# Patient Record
Sex: FEMALE
[De-identification: names, ages, dates, MRNs, and addresses within clinical notes are randomized per-mention and may not be internally consistent; named-entity substitution may affect disease eponyms.]

---

## 2023-06-08 PROBLEM — Z00.00 ENCOUNTER FOR PREVENTIVE HEALTH EXAMINATION: Status: ACTIVE | Noted: 2023-06-08

## 2023-06-21 ENCOUNTER — APPOINTMENT (OUTPATIENT)
Dept: NEUROSURGERY | Facility: CLINIC | Age: 53
End: 2023-06-21
Payer: MEDICARE

## 2023-06-21 ENCOUNTER — NON-APPOINTMENT (OUTPATIENT)
Age: 53
End: 2023-06-21

## 2023-06-21 VITALS
OXYGEN SATURATION: 98 % | HEART RATE: 74 BPM | WEIGHT: 186 LBS | BODY MASS INDEX: 31.76 KG/M2 | HEIGHT: 64 IN | SYSTOLIC BLOOD PRESSURE: 125 MMHG | RESPIRATION RATE: 18 BRPM | DIASTOLIC BLOOD PRESSURE: 83 MMHG

## 2023-06-21 DIAGNOSIS — R56.9 UNSPECIFIED CONVULSIONS: ICD-10-CM

## 2023-06-21 PROCEDURE — 99205 OFFICE O/P NEW HI 60 MIN: CPT

## 2023-06-21 NOTE — PHYSICAL EXAM
[General Appearance - Alert] : alert [General Appearance - In No Acute Distress] : in no acute distress [General Appearance - Well Nourished] : well nourished [General Appearance - Well-Appearing] : healthy appearing [Oriented To Time, Place, And Person] : oriented to person, place, and time [Impaired Insight] : insight and judgment were intact [Affect] : the affect was normal [Memory Recent] : recent memory was not impaired [Person] : oriented to person [Place] : oriented to place [Time] : oriented to time [Short Term Intact] : short term memory intact [Remote Intact] : remote memory intact [Registration Intact] : recent registration memory intact [Span Intact] : the attention span was normal [Concentration Intact] : normal concentrating ability [Fluency] : fluency intact [Comprehension] : comprehension intact [Current Events] : adequate knowledge of current events [Past History] : adequate knowledge of personal past history [Vocabulary] : adequate range of vocabulary [PERRL With Normal Accommodation] : pupils were equal in size, round, reactive to light, with normal accommodation [Extraocular Movements] : extraocular movements were intact

## 2023-06-24 NOTE — DATA REVIEWED
[de-identified] : brain IAC w/wo @ [facility] on 5/1/2023\par IMPRESSION: \par  \par  \par Stable examination. No acute abnormality.\par  \par  \par CLINICAL INDICATION: Left trigeminal neuralgia gamma knife\par  \par TECHNIQUE: Multi-planar multi-sequential MR imaging of the brain with special attention to the internal auditory canals was performed before and after the intravenous administration of contrast. 8.2 ml of GADOBUTROL 10 MMOL/10 ML (1 MMOL/ML) INTRAVENOUS SOLUTION was administered (the balance of single use vial(s) has/have been discarded).\par  \par COMPARISON: July 5, 2020\par  \par FINDINGS:\par  \par Evaluation of the internal auditory canals and cerebellopontine angle cisterns reveals no mass and no abnormal enhancement. The cisternal and canalicular portions of the seventh and eighth cranial nerves are normal. The bilateral vestibulocochlear complexes appear intact.\par There is a mildly, asymmetrically diminutive volume of the cisternal left 5th nerve\par No acute infarction, intracranial hemorrhage or intraparenchymal mass lesion. No abnormal intraparenchymal enhancement is identified.\par  \par No hydrocephalus. No extra-axial fluid collections. The skull base flow voids are present.\par  \par The visualized intraorbital contents are normal. The imaged portions of the paranasal sinuses are clear. The mastoid air cells are clear. The visualized osseous structures, soft tissues and partially visualized parotid glands appear normal.\par  \par Electronic Signature: I personally reviewed the images and agree with this report. Final Report: Dictated by  and Signed by Attending Sadny Rodriguez MD 5/1/2023 6:14 PM

## 2023-06-24 NOTE — ASSESSMENT
[FreeTextEntry1] : Discussed with the patient for gamma knife SRS for L trigeminal neuralgia. Risks, benefit and alternative to the surgery was discussed with the patient. She verbalizes understanding and would like to proceed.\par \par - tentative GK SRS on 7/18/2023

## 2023-06-24 NOTE — HISTORY OF PRESENT ILLNESS
[FreeTextEntry1] : L side facial pain [de-identified] : 54 yo F with PMH of epilepsy presents for trigeminal neuralgia. \par She reports chronic L side facial pain for over 12 years which has been progressively worsening. Pain is described as intermittent sharp pain on her L sided frontotemporal area head and upper portion of L side face worsening by touch/cold water/wind/speech.\par She tried medications( tramadol, gabapentin, naproxen) with minimal relief. She tried Botox injection with some relief. \par She reports intermittent b/l blurry vision (states has been evaluated by opthalmology) but denies hearing problem, facial weakness, seizure activity or any other focal neuro deficits. \par \par MRI brain IAC w/wo was completed by neurology which showed and referred neuro sx for possible radiation treatment.  \par seizure activity for 6 years: feeling dizzy then passed out.

## 2023-06-24 NOTE — REASON FOR VISIT
oxi   [New Patient Visit] : a new patient visit [Referred By: _________] : Patient was referred by [facility] [[employer] ] : provided by [employer]   [Interpreters_IDNumber] : [id number] [TWNoteComboBox1] : Portuguese

## 2023-06-24 NOTE — ADDENDUM
[FreeTextEntry1] : Patient with intractable left trigeminal neuralgia. Referred by neurology for intervention due to poor pain relief with multiple medication trials over many years. Symptoms are of classic left trigeminal neuralgia in V1 and V2 distribution. We discussed interventions including microvascular decompression, percutaneous trigeminal rhizotomy, neuromodulation, and gamma knife radiosurgery. Due to the minimal invasiveness of gamma knife, this modality is favored for initial treatment. Risks including but not limited to facial numbness, treatment failure, radiation necrosis discussed. Patient would like to move forward with radiosurgery.

## 2023-06-29 ENCOUNTER — APPOINTMENT (OUTPATIENT)
Dept: RADIATION ONCOLOGY | Facility: CLINIC | Age: 53
End: 2023-06-29
Payer: MEDICAID

## 2023-06-29 DIAGNOSIS — Z83.3 FAMILY HISTORY OF DIABETES MELLITUS: ICD-10-CM

## 2023-06-29 PROCEDURE — 77263 THER RADIOLOGY TX PLNG CPLX: CPT

## 2023-06-29 PROCEDURE — 99443: CPT | Mod: 25

## 2023-06-29 NOTE — VITALS
[Maximal Pain Intensity: 6/10] : 6/10 [Least Pain Intensity: 0/10] : 0/10 [Pain Description/Quality: ___] : Pain description/quality: [unfilled] [Date: ____________] : Patient's last distress assessment performed on [unfilled]. [5 - Distress Level] : Distress Level: 5 [90: Able to carry normal activity; minor signs or symptoms of disease.] : 90: Able to carry normal activity; minor signs or symptoms of disease.  [ECOG Performance Status: 1 - Restricted in physically strenuous activity but ambulatory and able to carry out work of a light or sedentary nature] : Performance Status: 1 - Restricted in physically strenuous activity but ambulatory and able to carry out work of a light or sedentary nature, e.g., light house work, office work

## 2023-06-30 ENCOUNTER — OUTPATIENT (OUTPATIENT)
Dept: OUTPATIENT SERVICES | Facility: HOSPITAL | Age: 53
LOS: 1 days | Discharge: HOME | End: 2023-06-30
Payer: MEDICAID

## 2023-07-18 ENCOUNTER — APPOINTMENT (OUTPATIENT)
Dept: NEUROSURGERY | Facility: AMBULATORY SURGERY CENTER | Age: 53
End: 2023-07-18
Payer: MEDICARE

## 2023-07-18 ENCOUNTER — APPOINTMENT (OUTPATIENT)
Dept: MRI IMAGING | Facility: IMAGING CENTER | Age: 53
End: 2023-07-18

## 2023-07-18 ENCOUNTER — OUTPATIENT (OUTPATIENT)
Dept: OUTPATIENT SERVICES | Facility: HOSPITAL | Age: 53
LOS: 1 days | End: 2023-07-18
Payer: MEDICAID

## 2023-07-18 DIAGNOSIS — G50.0 TRIGEMINAL NEURALGIA: ICD-10-CM

## 2023-07-18 PROCEDURE — 77295 3-D RADIOTHERAPY PLAN: CPT | Mod: 26

## 2023-07-18 PROCEDURE — 61800 APPLY SRS HEADFRAME ADD-ON: CPT

## 2023-07-18 PROCEDURE — 76498 UNLISTED MR PROCEDURE: CPT

## 2023-07-18 PROCEDURE — 61798 SRS CRANIAL LESION COMPLEX: CPT

## 2023-07-18 PROCEDURE — 77432 STEREOTACTIC RADIATION TRMT: CPT

## 2023-07-18 PROCEDURE — 77300 RADIATION THERAPY DOSE PLAN: CPT | Mod: 26

## 2023-07-18 PROCEDURE — A9585: CPT

## 2023-07-18 RX ORDER — ACETAMINOPHEN 10 MG/ML
1000 INJECTION INTRAVENOUS
Qty: 0 | Refills: 0 | Status: COMPLETED | OUTPATIENT
Start: 2023-07-17

## 2023-07-18 RX ORDER — IBUPROFEN 400 MG/1
400 TABLET, FILM COATED ORAL
Qty: 0 | Refills: 0 | Status: COMPLETED | OUTPATIENT
Start: 2023-07-17

## 2023-07-18 RX ORDER — LIDOCAINE HYDROCHLORIDE 20 MG/ML
2 INJECTION, SOLUTION INFILTRATION; PERINEURAL
Qty: 0 | Refills: 0 | Status: COMPLETED | OUTPATIENT
Start: 2023-07-17

## 2023-07-18 NOTE — HISTORY OF PRESENT ILLNESS
[Home] : at home, [unfilled] , at the time of the visit. [Medical Office: ([facility])___] : at the medical office located in  [Verbal consent obtained from patient] : the patient, [unfilled] [FreeTextEntry1] : Ms. Miller presents today for consideration for radiation therapy for trigeminal neuralgia. \par \par She reports chronic left sided facial pain, progressively worsening over the past 12 years, intermittent and sharp. \par Reports pain comes and goes and affects the cheek V2. Episodes 2-3 x/week lasting for < 60 seconds\par Symptoms worse with temperature extremes\par \par Treatments\par injection, radiofrequency ablation\par Tramadol, Naproxen,  Neurontin, Carbamazepine, Topamax (all gave short lived relief)\par \par MRI brain done 5/1/2023 showed a mildly asymmetrically diminutive volume of the cisternal left 5th nerve.

## 2023-07-18 NOTE — REASON FOR VISIT
[Consideration of Curative Therapy] : consideration of curative therapy for [Other: ___] : [unfilled] [[employer] ] : provided by [employer]   [Interpreters_IDNumber] : [id number] [Interpreters_FullName] : Derrell [TWNoteComboBox1] : [place]

## 2023-07-19 ENCOUNTER — NON-APPOINTMENT (OUTPATIENT)
Age: 53
End: 2023-07-19

## 2023-08-18 ENCOUNTER — APPOINTMENT (OUTPATIENT)
Dept: NEUROSURGERY | Facility: CLINIC | Age: 53
End: 2023-08-18
Payer: MEDICAID

## 2023-08-18 DIAGNOSIS — Z92.3 PERSONAL HISTORY OF IRRADIATION: ICD-10-CM

## 2023-08-18 PROCEDURE — 99024 POSTOP FOLLOW-UP VISIT: CPT

## 2023-08-18 NOTE — REASON FOR VISIT
[Follow-Up: _____] : a [unfilled] follow-up visit [Home] : at home, [unfilled] , at the time of the visit. [Medical Office: ([facility])___] : at the medical office located in  [Spouse] : spouse [FreeTextEntry1] : s/p GKRS to left trigeminal nerve on 7/18/23

## 2023-08-18 NOTE — HISTORY OF PRESENT ILLNESS
[FreeTextEntry1] : L side facial pain [de-identified] : 52 yo F with PMH of epilepsy presents for trigeminal neuralgia.  She reports chronic L side facial pain for over 12 years which has been progressively worsening. Pain is described as intermittent sharp pain on her L sided frontotemporal area head and upper portion of L side face worsening by touch/cold water/wind/speech. She tried medications( tramadol, gabapentin, naproxen) with minimal relief. She tried Botox injection with some relief.  She reports intermittent b/l blurry vision (states has been evaluated by opthalmology) but denies hearing problem, facial weakness, seizure activity or any other focal neuro deficits.   MRI brain IAC w/wo was completed by neurology which showed and referred neuro sx for possible radiation treatment.   seizure activity for 6 years: feeling dizzy then passed out.   She is primarily Slovak speaking. Interpretation is provided via [facility] [employer]. Returns for follow up s/p GKRS to left trigeminal nerve on 7/18/23. She feels that her symptoms have slightly improved since procedure. Remains on medications including tramadol, gabapentin and meloxicam.

## 2023-08-18 NOTE — ASSESSMENT
[FreeTextEntry1] : Doing well s/p GKRS  Recommend follow up in 3 months to evaluate progress  Patient verbalizes agreement and understanding with plan of care. I, Dr. Recinos, personally performed the evaluation and management (E/M) services for this established patient who presents today with (a) new problem(s)/exacerbation of (an) existing condition(s).  That E/M includes conducting the examination, assessing all new/exacerbated conditions, and establishing a new plan of care.  Today, my ACP, Macy Ramey, was here to observe my evaluation and management services for this new problem/exacerbated condition to be followed going forward.

## 2023-09-27 ENCOUNTER — APPOINTMENT (OUTPATIENT)
Dept: RADIATION ONCOLOGY | Facility: CLINIC | Age: 53
End: 2023-09-27
Payer: MEDICAID

## 2023-09-27 DIAGNOSIS — G50.0 TRIGEMINAL NEURALGIA: ICD-10-CM

## 2023-09-27 PROCEDURE — 99024 POSTOP FOLLOW-UP VISIT: CPT

## 2023-09-27 RX ORDER — FUROSEMIDE 20 MG/1
20 TABLET ORAL
Refills: 0 | Status: ACTIVE | COMMUNITY
Start: 2023-09-27

## 2023-09-27 RX ORDER — MEMANTINE HYDROCHLORIDE 10 MG/1
10 TABLET, FILM COATED ORAL
Refills: 0 | Status: ACTIVE | COMMUNITY
Start: 2023-09-27

## 2023-09-27 RX ORDER — CARBAMAZEPINE 200 MG/1
TABLET ORAL
Refills: 0 | Status: DISCONTINUED | COMMUNITY
End: 2023-09-27

## 2023-09-27 RX ORDER — TOPIRAMATE 50 MG/1
50 TABLET, COATED ORAL
Refills: 0 | Status: DISCONTINUED | COMMUNITY
End: 2023-09-27

## 2023-09-27 RX ORDER — NAPROXEN 500 MG/1
TABLET ORAL
Refills: 0 | Status: DISCONTINUED | COMMUNITY
End: 2023-09-27

## 2025-01-23 PROBLEM — Z87.898 HISTORY OF SEIZURE: Status: RESOLVED | Noted: 2023-06-21 | Resolved: 2025-01-23

## 2025-01-27 ENCOUNTER — APPOINTMENT (OUTPATIENT)
Dept: NEUROSURGERY | Facility: CLINIC | Age: 55
End: 2025-01-27
Payer: MEDICARE

## 2025-01-27 DIAGNOSIS — Z92.3 PERSONAL HISTORY OF IRRADIATION: ICD-10-CM

## 2025-01-27 DIAGNOSIS — Z87.898 PERSONAL HISTORY OF OTHER SPECIFIED CONDITIONS: ICD-10-CM

## 2025-01-27 DIAGNOSIS — G50.0 TRIGEMINAL NEURALGIA: ICD-10-CM

## 2025-01-27 DIAGNOSIS — R51.9 HEADACHE, UNSPECIFIED: ICD-10-CM

## 2025-01-27 PROCEDURE — 99205 OFFICE O/P NEW HI 60 MIN: CPT

## 2025-01-27 RX ORDER — BUTALBITAL, ASPIRIN, AND CAFFEINE 325; 50; 40 MG/1; MG/1; MG/1
50-325-40 CAPSULE ORAL
Qty: 60 | Refills: 0 | Status: ACTIVE | COMMUNITY
Start: 2025-01-27 | End: 1900-01-01